# Patient Record
Sex: MALE | Race: WHITE | NOT HISPANIC OR LATINO | Employment: FULL TIME | URBAN - METROPOLITAN AREA
[De-identification: names, ages, dates, MRNs, and addresses within clinical notes are randomized per-mention and may not be internally consistent; named-entity substitution may affect disease eponyms.]

---

## 2019-04-16 ENCOUNTER — TRANSCRIBE ORDERS (OUTPATIENT)
Dept: ADMINISTRATIVE | Facility: HOSPITAL | Age: 59
End: 2019-04-16

## 2019-04-16 ENCOUNTER — CLINICAL SUPPORT (OUTPATIENT)
Dept: GASTROENTEROLOGY | Facility: CLINIC | Age: 59
End: 2019-04-16

## 2019-04-16 VITALS — HEIGHT: 68 IN | BODY MASS INDEX: 31.07 KG/M2 | WEIGHT: 205 LBS

## 2019-04-16 DIAGNOSIS — F17.210 CIGARETTE SMOKER: ICD-10-CM

## 2019-04-16 DIAGNOSIS — R07.9 CHEST PAIN, UNSPECIFIED TYPE: Primary | ICD-10-CM

## 2019-04-16 DIAGNOSIS — Z12.11 COLON CANCER SCREENING: Primary | ICD-10-CM

## 2019-04-16 RX ORDER — NAPROXEN 375 MG/1
TABLET ORAL
Refills: 0 | COMMUNITY
Start: 2019-02-12

## 2019-04-23 ENCOUNTER — HOSPITAL ENCOUNTER (OUTPATIENT)
Dept: NON INVASIVE DIAGNOSTICS | Facility: CLINIC | Age: 59
Discharge: HOME | End: 2019-04-23
Payer: COMMERCIAL

## 2019-04-23 DIAGNOSIS — R07.9 CHEST PAIN, UNSPECIFIED TYPE: ICD-10-CM

## 2019-04-23 DIAGNOSIS — F17.210 CIGARETTE SMOKER: ICD-10-CM

## 2019-04-23 PROCEDURE — 78452 HT MUSCLE IMAGE SPECT MULT: CPT | Performed by: INTERNAL MEDICINE

## 2019-04-23 PROCEDURE — 78452 HT MUSCLE IMAGE SPECT MULT: CPT

## 2019-04-23 PROCEDURE — 93016 CV STRESS TEST SUPVJ ONLY: CPT | Performed by: INTERNAL MEDICINE

## 2019-04-23 PROCEDURE — 93018 CV STRESS TEST I&R ONLY: CPT | Performed by: INTERNAL MEDICINE

## 2019-04-23 PROCEDURE — 93017 CV STRESS TEST TRACING ONLY: CPT

## 2019-04-23 PROCEDURE — A9502 TC99M TETROFOSMIN: HCPCS

## 2019-04-23 RX ADMIN — REGADENOSON 0.4 MG: 0.08 INJECTION, SOLUTION INTRAVENOUS at 14:06

## 2019-04-26 LAB
CHEST PAIN STATEMENT: NORMAL
MAX DIASTOLIC BP: 80 MMHG
MAX HEART RATE: 122 BPM
MAX PREDICTED HEART RATE: 161 BPM
MAX. SYSTOLIC BP: 180 MMHG
PROTOCOL NAME: NORMAL
REASON FOR TERMINATION: NORMAL
TARGET HR FORMULA: NORMAL
TEST INDICATION: NORMAL
TIME IN EXERCISE PHASE: NORMAL

## 2020-09-19 ENCOUNTER — APPOINTMENT (OUTPATIENT)
Dept: RADIOLOGY | Facility: CLINIC | Age: 60
End: 2020-09-19
Payer: COMMERCIAL

## 2020-09-19 ENCOUNTER — OFFICE VISIT (OUTPATIENT)
Dept: URGENT CARE | Facility: CLINIC | Age: 60
End: 2020-09-19
Payer: COMMERCIAL

## 2020-09-19 VITALS
OXYGEN SATURATION: 98 % | HEIGHT: 68 IN | DIASTOLIC BLOOD PRESSURE: 97 MMHG | TEMPERATURE: 99 F | SYSTOLIC BLOOD PRESSURE: 186 MMHG | HEART RATE: 90 BPM | RESPIRATION RATE: 16 BRPM | BODY MASS INDEX: 30.52 KG/M2 | WEIGHT: 201.4 LBS
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
## 2020-09-19 DIAGNOSIS — R07.89 CHEST WALL PAIN: ICD-10-CM
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
## 2020-09-19 DIAGNOSIS — M54.6 ACUTE MIDLINE THORACIC BACK PAIN: ICD-10-CM

## 2020-09-19 PROCEDURE — 99283 EMERGENCY DEPT VISIT LOW MDM: CPT | Performed by: PHYSICIAN ASSISTANT

## 2020-09-19 PROCEDURE — 72040 X-RAY EXAM NECK SPINE 2-3 VW: CPT

## 2020-09-19 PROCEDURE — G0382 LEV 3 HOSP TYPE B ED VISIT: HCPCS | Performed by: PHYSICIAN ASSISTANT

## 2020-09-19 PROCEDURE — 72072 X-RAY EXAM THORAC SPINE 3VWS: CPT

## 2020-09-19 PROCEDURE — 71101 X-RAY EXAM UNILAT RIBS/CHEST: CPT

## 2020-09-19 NOTE — PROGRESS NOTES
NAME: Aurora Ryan is a 61 y o  male  : 1960    MRN: [id number]      Assessment and Plan   Fall, initial encounter [W19  XXXA]  1  Fall, initial encounter  XR spine cervical 2 or 3 vw injury    XR spine thoracic 3 vw    XR ribs right w pa chest min 3 views    Transfer to other facility   2  Acute midline thoracic back pain  Transfer to other facility   3  Chest wall pain  Transfer to other facility     x-ray c spine: no fractures visualized  X-ray thoracic spine: no fractures visualized  X-ray right ribs: no fractures visualized     Discussed with patient no acute fracture seen on x-ray but patient is exquisitely tender and pain is out of proportion on exam in the midline of C7 down to T6/7  Also exquisitely tender to the right lateral ribs  He is guarding and wincing with even light palpation  Discussed that if he is in this much pain and he does not quite remember the fall he should go to the ER for further evaluation  He acknowledges and agrees  His brother drove him to the ER    Patient Instructions   Patient Instructions   Patient referred to ER for further evaluation    Proceed to ER if symptoms worsen  Chief Complaint     Chief Complaint   Patient presents with    Back Pain     Pt fell last night  Pt landed on his back with severe pain bilateral posterior back between shoulder blades around entire circumfrance of body into chest   Pain now 10/10  Sitting, jostled in the car, pain with breathing  Pt admits to being intoxicated last night  History of Present Illness   Patient with no reported past medical history presents complaining of severe pain to his upper back x1 day  States last night he had a little too much to drink and states he became off balance while attempting to get into bed and fell flat on his back on his bedroom floor  Reports does "not think I hit my head" and denies any headache, dizziness, lightheadedness, photophobia, phonophobia, nausea or vomiting    No blurry vision or double vision  States the pain started his upper back in the middle and reports pain between shoulder blades and states the wraps all the way around to his anterior lower ribs especially on the right side  He denies any numbness or tingling to the hands or legs or feet, weakness in the upper lower extremities, bowel or bladder dysfunction or any saddle anesthesia  Has not taken anything over-the-counter  Reports pain with deep inspiration but denies any shortness of breath or dyspnea  Denies any neck pain  Review of Systems   Review of Systems   Constitutional: Negative for chills and fever  Respiratory: Negative for cough, shortness of breath, wheezing and stridor  Cardiovascular: Negative for chest pain and palpitations  Gastrointestinal: Negative for abdominal pain, nausea and vomiting  Musculoskeletal: Positive for back pain  Negative for neck pain and neck stiffness  Thoracic pain and rib pain    Neurological: Negative for dizziness, tremors, seizures, syncope, speech difficulty, weakness, light-headedness, numbness and headaches  Current Medications       Current Outpatient Medications:     naproxen (NAPROSYN) 375 mg tablet, take 1 tablet by mouth twice a day if needed for pain, Disp: , Rfl: 0    Na Sulfate-K Sulfate-Mg Sulf (SUPREP BOWEL PREP KIT) 17 5-3 13-1 6 GM/177ML SOLN, As directed (Patient not taking: Reported on 9/19/2020), Disp: 2 Bottle, Rfl: 0    Current Allergies     Allergies as of 09/19/2020    (No Known Allergies)              Past Medical History:   Diagnosis Date    Hyperlipemia        Past Surgical History:   Procedure Laterality Date    APPENDECTOMY         History reviewed  No pertinent family history  Medications have been verified      The following portions of the patient's history were reviewed and updated as appropriate: allergies, current medications, past family history, past medical history, past social history, past surgical history and problem list     Objective   BP (!) 186/97   Pulse 90   Temp 99 °F (37 2 °C)   Resp 16   Ht 5' 8" (1 727 m)   Wt 91 4 kg (201 lb 6 4 oz)   SpO2 98%   BMI 30 62 kg/m²      Physical Exam     Physical Exam  Vitals signs and nursing note reviewed  Constitutional:       General: He is not in acute distress  Appearance: Normal appearance  He is not ill-appearing, toxic-appearing or diaphoretic  Neck:      Comments: Tender to palpation in the midline at C7  No other tenderness in the midline  Tender to palpation over the bilateral PVMs into the trapezius  Decreased active range of motion in all directions due to pain  Cardiovascular:      Rate and Rhythm: Normal rate and regular rhythm  Heart sounds: No murmur  Pulmonary:      Effort: Pulmonary effort is normal  No respiratory distress  Breath sounds: Normal breath sounds  No stridor  No wheezing, rhonchi or rales  Comments: Full and equal breath sounds throughout  Pain with deep inspiration  Musculoskeletal:      Comments: Exquisitely tender to palpation in the midline of T1 to T6/7  Tender to palpation in the bilateral PVMs as well  Tender to palpation over the scapula bilaterally  Decreased active range of motion of upper extremities in extension and internal rotation due to pain  Full strength of upper extremities bilaterally  Anterior chest:  No tenderness over the sternum  Exquisitely tender to palpation over the right anterior/lateral ribs at ribs 6 through 9  No deformities or step-offs palpated  Neurological:      Mental Status: He is alert

## 2021-07-09 ENCOUNTER — APPOINTMENT (OUTPATIENT)
Dept: RADIOLOGY | Facility: CLINIC | Age: 61
End: 2021-07-09
Payer: COMMERCIAL

## 2021-07-09 DIAGNOSIS — M47.26 OTHER SPONDYLOSIS WITH RADICULOPATHY, LUMBAR REGION: ICD-10-CM

## 2021-07-09 DIAGNOSIS — R05.9 COUGH: ICD-10-CM

## 2021-07-09 PROCEDURE — 71046 X-RAY EXAM CHEST 2 VIEWS: CPT

## 2021-07-09 PROCEDURE — 72100 X-RAY EXAM L-S SPINE 2/3 VWS: CPT

## 2023-12-28 ENCOUNTER — OFFICE VISIT (OUTPATIENT)
Dept: URGENT CARE | Facility: CLINIC | Age: 63
End: 2023-12-28
Payer: COMMERCIAL

## 2023-12-28 VITALS
BODY MASS INDEX: 25.01 KG/M2 | WEIGHT: 165 LBS | SYSTOLIC BLOOD PRESSURE: 150 MMHG | HEART RATE: 78 BPM | OXYGEN SATURATION: 95 % | DIASTOLIC BLOOD PRESSURE: 79 MMHG | TEMPERATURE: 98.5 F | RESPIRATION RATE: 14 BRPM | HEIGHT: 68 IN

## 2023-12-28 DIAGNOSIS — S61.219A LACERATION WITHOUT FOREIGN BODY OF UNSPECIFIED FINGER WITHOUT DAMAGE TO NAIL, INITIAL ENCOUNTER: Primary | ICD-10-CM

## 2023-12-28 PROCEDURE — 90471 IMMUNIZATION ADMIN: CPT | Performed by: PHYSICIAN ASSISTANT

## 2023-12-28 PROCEDURE — 90715 TDAP VACCINE 7 YRS/> IM: CPT | Performed by: PHYSICIAN ASSISTANT

## 2023-12-28 PROCEDURE — 99283 EMERGENCY DEPT VISIT LOW MDM: CPT | Performed by: PHYSICIAN ASSISTANT

## 2023-12-28 PROCEDURE — 12001 RPR S/N/AX/GEN/TRNK 2.5CM/<: CPT | Performed by: PHYSICIAN ASSISTANT

## 2023-12-28 PROCEDURE — G0382 LEV 3 HOSP TYPE B ED VISIT: HCPCS | Performed by: PHYSICIAN ASSISTANT

## 2023-12-28 PROCEDURE — 90715 TDAP VACCINE 7 YRS/> IM: CPT

## 2023-12-28 NOTE — PROGRESS NOTES
[facility]        NAME: Gautam Kasper is a 63 y.o. male  : 1960    MRN: [id number]  DATE: 2023  TIME: 2:16 PM    Assessment and Plan   Laceration without foreign body of unspecified finger without damage to nail, initial encounter [S61.219A]  1. Laceration without foreign body of unspecified finger without damage to nail, initial encounter  TDAP VACCINE GREATER THAN OR EQUAL TO 6YO IM            Patient Instructions       Follow up with PCP in 3-5 days.  Proceed to  ER if symptoms worsen.    Chief Complaint     Chief Complaint   Patient presents with    Laceration     Lacerated finger on  cutting food         History of Present Illness       63-year-old male presents with laceration to his right index finger.  Patient reports on 2023 he was cutting some meat and accidentally cut his finger.  Since then he has been cleaning it out and applying Neosporin but continues to have some bleeding from the area.  Unsure of last tetanus.  Denies any numbness or tingling.    Hand Injury   The incident occurred 3 to 5 days ago. The incident occurred at home. Injury mechanism: Laceration from a knife. The quality of the pain is described as burning. The pain does not radiate. The pain is mild. The pain has been Fluctuating since the incident. Pertinent negatives include no chest pain, muscle weakness, numbness or tingling. The symptoms are aggravated by palpation. He has tried rest for the symptoms. The treatment provided no relief.       Review of Systems   Review of Systems   Constitutional: Negative.    Respiratory: Negative.     Cardiovascular: Negative.  Negative for chest pain.   Gastrointestinal: Negative.    Musculoskeletal: Negative.    Skin:  Positive for wound.   Neurological: Negative.  Negative for tingling and numbness.         Current Medications       Current Outpatient Medications:     Na Sulfate-K Sulfate-Mg Sulf (SUPREP BOWEL PREP KIT) 17.5-3.13-1.6 GM/177ML SOLN, As  "directed (Patient not taking: Reported on 9/19/2020), Disp: 2 Bottle, Rfl: 0    naproxen (NAPROSYN) 375 mg tablet, take 1 tablet by mouth twice a day if needed for pain, Disp: , Rfl: 0    Current Allergies     Allergies as of 12/28/2023    (No Known Allergies)            The following portions of the patient's history were reviewed and updated as appropriate: allergies, current medications, past family history, past medical history, past social history, past surgical history and problem list.     Past Medical History:   Diagnosis Date    Hyperlipemia        Past Surgical History:   Procedure Laterality Date    APPENDECTOMY         History reviewed. No pertinent family history.      Medications have been verified.        Objective   /79   Pulse 78   Temp 98.5 °F (36.9 °C) (Temporal)   Resp 14   Ht 5' 8\" (1.727 m)   Wt 74.8 kg (165 lb)   SpO2 95%   BMI 25.09 kg/m²   No LMP for male patient.       Physical Exam     Physical Exam  Vitals and nursing note reviewed.   Constitutional:       General: He is not in acute distress.     Appearance: Normal appearance. He is well-developed.   HENT:      Head: Normocephalic and atraumatic.      Right Ear: External ear normal.      Left Ear: External ear normal.      Nose: Nose normal.   Eyes:      General:         Right eye: No discharge.         Left eye: No discharge.      Conjunctiva/sclera: Conjunctivae normal.   Cardiovascular:      Rate and Rhythm: Normal rate and regular rhythm.   Pulmonary:      Effort: Pulmonary effort is normal. No respiratory distress.   Musculoskeletal:         General: Normal range of motion.      Right hand: Laceration present.      Cervical back: Normal range of motion and neck supple.   Skin:     General: Skin is warm and dry.      Findings: Laceration (1.5 cm laceration noted to lateral aspect of right index finger) present.   Neurological:      Mental Status: He is alert and oriented to person, place, and time.   Psychiatric:         " Mood and Affect: Mood normal.         Behavior: Behavior normal.         Universal Protocol:  Consent: Verbal consent obtained. Written consent obtained.  Risks and benefits: risks, benefits and alternatives were discussed  Consent given by: patient  Patient understanding: patient states understanding of the procedure being performed  Patient consent: the patient's understanding of the procedure matches consent given  Procedure consent: procedure consent matches procedure scheduled  Patient identity confirmed: verbally with patient  Laceration repair    Date/Time: 12/28/2023 4:00 PM    Performed by: Ashutosh Larson PA-C  Authorized by: Ashutosh Larson PA-C  Body area: upper extremity  Location details: right index finger  Laceration length: 1.5 cm  Foreign bodies: no foreign bodies  Tendon involvement: none  Nerve involvement: none  Vascular damage: no    Sedation:  Patient sedated: no      Wound Dehiscence:  Superficial Wound Dehiscence: simple closure      Procedure Details:  Irrigation solution: saline  Irrigation method: jet lavage  Amount of cleaning: standard  Debridement: none  Degree of undermining: none  Skin closure: Steri-Strips  Approximation: close  Approximation difficulty: simple  Patient tolerance: patient tolerated the procedure well with no immediate complications  Comments: Applied Coban 2 fingers to hold Steri-Strips down

## 2023-12-28 NOTE — PATIENT INSTRUCTIONS
Laceration   AMBULATORY CARE:   A laceration  is an injury to the skin and the soft tissue underneath it. Lacerations can happen anywhere on the body.  Common symptoms include the following:   Injury or wound to skin and tissue of any shape size that looks like a cut, tear, or gash    Edges of the wound may be close together or wide apart    Pain, bleeding, bruising, or swelling    Numbness around the wound    Decreased movement in an area below the wound    Seek care immediately if:   You have heavy bleeding or bleeding that does not stop after 10 minutes of holding firm, direct pressure over the wound.    Your wound opens up.    Call your doctor if:   You have a fever or chills.    Your laceration is red, warm, or swollen.    You have red streaks on your skin coming from your wound.    You have white or yellow drainage from the wound that smells bad.    You have pain that gets worse, even after treatment.    You have questions or concerns about your condition or care.    Treatment for a laceration  includes care to stop any bleeding. Your healthcare provider will stop the bleeding by applying pressure to the wound. He or she may need to check your wound for foreign objects and clean it to decrease the chance of infection. Your laceration may be closed with stitches, staples, tissue glue, or medical strips. Ask your healthcare provider if you need a tetanus shot.        Medicines:  You may need any of the following:  Prescription pain medicine  may be given. Ask your healthcare provider how to take this medicine safely. Some prescription pain medicines contain acetaminophen. Do not take other medicines that contain acetaminophen without talking to your healthcare provider. Too much acetaminophen may cause liver damage. Prescription pain medicine may cause constipation. Ask your healthcare provider how to prevent or treat constipation.     Antibiotics  help treat or prevent a bacterial infection.    Take your  medicine as directed.  Contact your healthcare provider if you think your medicine is not helping or if you have side effects. Tell your provider if you are allergic to any medicine. Keep a list of the medicines, vitamins, and herbs you take. Include the amounts, and when and why you take them. Bring the list or the pill bottles to follow-up visits. Carry your medicine list with you in case of an emergency.    Care for your wound as directed:   Do not get your wound wet  until your healthcare provider says it is okay. Do not soak your wound in water. Do not go swimming until your healthcare provider says it is okay. Carefully wash the wound with soap and water. Gently pat the area dry or allow it to air dry.    Change your bandages  when they get wet, dirty, or after washing. Apply new, clean bandages as directed. Do not apply elastic bandages or tape too tight. Do not put powders or lotions over your incision.    Apply antibiotic ointment as directed.  Your healthcare provider may give you antibiotic ointment to put over your wound if you have stitches. If you have strips of tape over your incision, let them dry up and fall off on their own. If they do not fall off within 14 days, gently remove them. If you have glue over your wound, do not remove or pick at it. If your glue comes off, do not replace it with glue that you have at home.    Check your wound every day for signs of infection, such as swelling, redness, or pus.    Self-care:   Apply ice  on your wound for 15 to 20 minutes every hour or as directed. Use an ice pack, or put crushed ice in a plastic bag. Cover it with a towel. Ice helps prevent tissue damage and decreases swelling and pain.    Use a splint as directed.  A splint will decrease movement and stress on your wound. It may help it heal faster. A splint may be used for lacerations over joints or areas of your body that bend. Ask your healthcare provider how to apply and remove a  splint.    Decrease scarring of your wound  by applying ointments as directed. Do not apply ointments until your healthcare provider says it is okay. You may need to wait until your wound is healed. Ask which ointment to buy and how often to use it. After your wound is healed, use sunscreen over the area when you are out in the sun. You should do this for at least 6 months to 1 year after your injury.    Follow up with your doctor as directed:  You will need to return in 3 to 14 days to have stitches or staples removed. Write down your questions so you remember to ask them during your visits.  © Copyright [employer] 2023 Information is for End User's use only and may not be sold, redistributed or otherwise used for commercial purposes.  The above information is an  only. It is not intended as medical advice for individual conditions or treatments. Talk to your doctor, nurse or pharmacist before following any medical regimen to see if it is safe and effective for you.